# Patient Record
Sex: FEMALE | Race: BLACK OR AFRICAN AMERICAN | NOT HISPANIC OR LATINO | URBAN - METROPOLITAN AREA
[De-identification: names, ages, dates, MRNs, and addresses within clinical notes are randomized per-mention and may not be internally consistent; named-entity substitution may affect disease eponyms.]

---

## 2019-07-12 NOTE — PATIENT DISCUSSION
GLAUCOMA SUSPECT:  Pt has normal IOPs , normal visual fields, and suspicious cups ou. Will monitor. Pachymetry interpretation reveals normal thickness ou.

## 2019-07-29 NOTE — PATIENT DISCUSSION
- Discussed the r/b/a of surgery. Patient wishes to proceed, but may want to wait a few months.  Wants to discuss with family as well

## 2019-07-29 NOTE — PATIENT DISCUSSION
- Reportedly has amblyopia OS, though trauma occurred after amblyogenic window. Vision equal OU today.

## 2020-03-02 NOTE — PATIENT DISCUSSION
- I discussed a variety of IOL options with the patient and provided the patient with written information.  Discussed standard monofocal, multifocal, extended depth of focus, and toric IOL options

## 2020-03-02 NOTE — PATIENT DISCUSSION
Stopped Today: Combigan (brimonidine-timolol): drops: 0.2-0.5% 1 drop twice a day as directed into right eye

## 2020-09-02 ENCOUNTER — IMPORTED ENCOUNTER (OUTPATIENT)
Dept: URBAN - METROPOLITAN AREA CLINIC 9 | Facility: CLINIC | Age: 60
End: 2020-09-02

## 2020-10-05 NOTE — PATIENT DISCUSSION
- Follow up in 1-2 weeks for [facility]

## 2021-10-16 ASSESSMENT — KERATOMETRY
OS_AXISANGLE2_DEGREES: 83
OD_K1POWER_DIOPTERS: 42.75
OD_K2POWER_DIOPTERS: 43
OS_K2POWER_DIOPTERS: 43
OS_K1POWER_DIOPTERS: 41.75
OD_AXISANGLE2_DEGREES: 91
OD_AXISANGLE_DEGREES: 1
OS_AXISANGLE_DEGREES: 173

## 2021-10-16 ASSESSMENT — VISUAL ACUITY
OS_CC: 20/25 SN
OD_CC: 20/20 SN
OS_CC: 20/20 SN
OD_CC: 20/25 - SN

## 2021-10-16 ASSESSMENT — TONOMETRY
OD_IOP_MMHG: 14
OS_IOP_MMHG: 14

## 2021-10-27 ENCOUNTER — ESTABLISHED PATIENT (OUTPATIENT)
Dept: URBAN - METROPOLITAN AREA CLINIC 4 | Facility: CLINIC | Age: 61
End: 2021-10-27

## 2021-10-27 DIAGNOSIS — H04.123: ICD-10-CM

## 2021-10-27 DIAGNOSIS — M35.00: ICD-10-CM

## 2021-10-27 DIAGNOSIS — H25.13: ICD-10-CM

## 2021-10-27 DIAGNOSIS — H11.153: ICD-10-CM

## 2021-10-27 PROCEDURE — 92014 COMPRE OPH EXAM EST PT 1/>: CPT

## 2021-10-27 PROCEDURE — 92015 DETERMINE REFRACTIVE STATE: CPT

## 2021-10-27 ASSESSMENT — TONOMETRY
OD_IOP_MMHG: 17
OS_IOP_MMHG: 19

## 2021-10-27 ASSESSMENT — KERATOMETRY
OS_K1POWER_DIOPTERS: 41.75
OS_K2POWER_DIOPTERS: 43.50
OD_AXISANGLE_DEGREES: 180
OD_K2POWER_DIOPTERS: 42.75
OD_K1POWER_DIOPTERS: 42.00
OD_AXISANGLE2_DEGREES: 90
OS_AXISANGLE_DEGREES: 169
OS_AXISANGLE2_DEGREES: 79

## 2021-10-27 ASSESSMENT — VISUAL ACUITY
OD_CC: 20/25-2
OS_GLARE: 20/40
OS_CC: 20/25-1
OD_GLARE: 20/40

## 2022-11-02 ENCOUNTER — ESTABLISHED PATIENT (OUTPATIENT)
Dept: URBAN - METROPOLITAN AREA CLINIC 4 | Facility: CLINIC | Age: 62
End: 2022-11-02

## 2022-11-02 DIAGNOSIS — H11.153: ICD-10-CM

## 2022-11-02 PROCEDURE — 92014 COMPRE OPH EXAM EST PT 1/>: CPT

## 2022-11-02 PROCEDURE — 92015 DETERMINE REFRACTIVE STATE: CPT

## 2022-11-02 ASSESSMENT — KERATOMETRY
OD_K1POWER_DIOPTERS: 42.50
OS_AXISANGLE_DEGREES: 168
OS_K2POWER_DIOPTERS: 43.50
OD_AXISANGLE2_DEGREES: 87
OS_K1POWER_DIOPTERS: 41.50
OD_K2POWER_DIOPTERS: 43.50
OS_AXISANGLE2_DEGREES: 78
OD_AXISANGLE_DEGREES: 177

## 2022-11-02 ASSESSMENT — VISUAL ACUITY
OD_GLARE: 20/40-1
OD_CC: 20/25-1
OU_CC: 20/20
OS_GLARE: 20/40-1
OS_CC: 20/20-1

## 2022-11-02 ASSESSMENT — TONOMETRY
OD_IOP_MMHG: 15
OS_IOP_MMHG: 16